# Patient Record
(demographics unavailable — no encounter records)

---

## 2019-01-10 NOTE — RAD
Examination: CT HEAD WO CONTRAST

 

History: DIZZINESS

 

Comparison/Correlation: None

 

Findings: Axial images of the head were obtained without contrast.

 

Atrophy is present. No intracranial hemorrhage, midline shift, or mass 

effect. Opacification of the left external auditory canal probably is due 

to cerumen. Degenerative space narrowing of the lower cervical spine. 

Deformity of the medial walls of maxillary sinuses noted. Correlate with 

surgical history. No depressed fracture.

 

 

Impression:

No intracranial hemorrhage.

 

Electronically signed by: Akira Floyd MD (1/10/2019 5:48 PM) [facility]

## 2019-01-10 NOTE — PHYS DOC
Adult General


Chief Complaint


Chief Complaint:  MULTIPLE COMPLAINTS





HPI


HPI





Patient is an 87 year old female who presents with several complaints.


#1. Continued frequency and urgency. Patient was seen 2 days ago and started on 

nitrofurantoin for urinary tract infection at an outlying clinic. Patient 

reports that she is doing a little bit better but continues to have to go 

frequently. Nothing seems to make this better or worse.


#2. Occasional right posterior thigh pain. Patient describes it as achy. No 

trauma. Patient is able to ambulate. Nothing seems to make it worse. Improves 

with Tylenol No. 3. Discomfort is mild at worst and nonexistent currently. 

Patient denies any swelling. Patient reports that her pants as well as her 

nylons go on without any new difficulties.


#3. Dizziness. She describes a rotational dizziness only when she goes to the 

back door to let in family members. This does not happen with any other exertion

, change in position, or head movements. Patient denies any chest pain or 

palpitations. Patient reports that last just a couple of minutes and goes away. 

There is no tinnitus, no drainage from the ears. There is no headache. No 

change in vision. []





Review of Systems


Review of Systems





Constitutional: Denies fever or chills []


Eyes: Denies change in visual acuity, redness, or eye pain []


HENT: Denies nasal congestion or sore throat []


Respiratory: Denies cough or shortness of breath []


Cardiovascular: No chest pain or palpitations[]


GI: Denies abdominal pain, nausea, vomiting, bloody stools or diarrhea []


: See history of present illness[]


Musculoskeletal: See history of present illness[]


Integument: Denies rash or skin lesions []


Neurologic: Denies headache, focal weakness or sensory changes []


Endocrine: Denies polyuria or polydipsia []





All other systems were reviewed and found to be within normal limits, except as 

documented in this note.





Physical Exam


Physical Exam





Constitutional: Well developed, well nourished, no acute distress, non-toxic 

appearance. []


HENT: Normocephalic, atraumatic, bilateral external ears normal, oropharynx 

moist, no oral exudates, nose normal. []


Eyes: PERRLA, EOMI, conjunctiva normal, no discharge. [] 


Neck: Normal range of motion, no tenderness, supple, no stridor. [] 


Cardiovascular:Heart rate regular rhythm, no murmur []


Lungs & Thorax:  Bilateral breath sounds clear to auscultation []


Abdomen: Bowel sounds normal, soft, no tenderness, no masses, no pulsatile 

masses. [] 


Skin: Warm, dry, no erythema, no rash. [] 


Back: No tenderness, no CVA tenderness. [] 


Extremities: No tenderness, no cyanosis, no clubbing, ROM intact, no edema. 

Patient's right thigh has no tenderness, no spasm, no hip or knee tenderness, 

and no edema. [] 


Neurologic: Alert and oriented X 3, normal motor function, normal sensory 

function, no focal deficits noted. Hallpike maneuvers were performed and were 

negative either for nystagmus or symptoms.[]


Psychologic: Affect normal, judgement normal, mood normal. []





EKG


EKG


Sinus rhythm, rate 61, normal axis, no ST elevations or depressions.[]





Radiology/Procedures


Radiology/Procedures


[]


Impressions:


Examination: CT HEAD WO CONTRAST


 


History: DIZZINESS


 


Comparison/Correlation: None


 


Findings: Axial images of the head were obtained without contrast.


 


Atrophy is present. No intracranial hemorrhage, midline shift, or mass 


effect. Opacification of the left external auditory canal probably is due 


to cerumen. Degenerative space narrowing of the lower cervical spine. 


Deformity of the medial walls of maxillary sinuses noted. Correlate with 


surgical history. No depressed fracture.


 


 


Impression:


No intracranial hemorrhage.


 


Electronically signed by: Akira Newsome MD (1/10/2019 5:48 PM) [facility]














DICTATED AND SIGNED BY:     AKIRA NEWSOME MD


DATE:     01/10/19 1602





CC: KARLA DAVIS DO; PEPE ALBERT





Course & Med Decision Making


Course & Med Decision Making


Pertinent Labs and Imaging studies reviewed. (See chart for details)





ED course: Patient arrived, was placed in bed, in tolerated exam well. Patient 

care endorsed to the oncoming physician at 1800 with labs and imaging studies 

pending.





The patient's head CT is negative for acute findings.





The patient's urinalysis is still significant for UTI. She is only had 3 doses 

of Macrobid. I will give her 1 g of Rocephin in the ED. Creatinine is 1.5. Her 

labs are significant for a slightly elevated troponin of 0.033 no previous for 

comparison. She also has a pro BNP of 900. Again had no previous for 

comparison. Patient denies any chest discomfort. I advised the patient would be 

best if she was admitted to the hospital for trending of her troponins and 

further evaluation of her elevated proBNP if warranted. Patient refused 

admission. She wants to go home. I explained that the risks including heart 

attack and/or death. She stated verbal understanding of these risks. Her family 

who accompanies her also acknowledged these risks. The patient would still 

prefer to go home and follow-up labs in the morning. She will be staying with 

family tonight and will return to emergency room if she develops worsening 

symptoms or any new symptoms. I have advised she continue her Macrobid as 

previously prescribed. She will be discharged[]





Dragon Disclaimer


Dragon Disclaimer


This electronic medical record was generated, in whole or in part, using a 

voice recognition dictation system.





Departure


Departure:


Impression:  


 Primary Impression:  


 UTI (urinary tract infection)


 Additional Impression:  


 Elevated troponin


Disposition:  01 HOME, SELF-CARE


Condition:  GUARDED


Referrals:  


PEEP ALBERT (PCP)


Patient Instructions:  Chest Pain (Nonspecific), Easy-to-Read


Scripts


Tramadol Hcl (TRAMADOL HCL) 50 Mg Tablet


50 MG PO PRN Q6HRS PRN for PAIN, #10 TAB


   Prov: GABE LOPEZ DO         1/10/19





Problem Qualifiers











KARLA DAVIS DO Oswald 10, 2019 17:33


GABE LOPEZ DO Oswald 10, 2019 18:11

## 2019-01-12 NOTE — EKG
[facility] [address], [place] [address]

Test Date:    2019-01-10               Test Time:    17:47:20

Pat Name:     BERTIN SOLORIO         Department:   

Patient ID:   [id number]           Room:          

Gender:       F                        Technician:   KEVIN

:          [id number]               Requested By: KARLA DAVIS

Order Number: [id number]            Reading MD:     

                                 Measurements

Intervals                              Axis          

Rate:         61                       P:            0

CA:           138                      QRS:          49

QRSD:         90                       T:            102

QT:           426                                    

QTc:          430                                    

                           Interpretive Statements

SINUS RHYTHM

QRS(T) CONTOUR ABNORMALITY

CONSIDER ANTEROLATERAL MYOCARDIAL DAMAGE

CONSIDER INFERIOR MYOCARDIAL DAMAGE

POSSIBLY ABNORMAL ECG

RI6.01          Unconfirmed report

No previous ECG available for comparison

## 2019-01-15 NOTE — RAD
Examination: CHEST AP ONLY

 

History: SYNCOPE, DIZZINESS

 

Comparison/Correlation: None

 

Findings: AP view of the chest was provided. Heart size and pulmonary 

vasculature are normal. No infiltrate or effusion. Calcified granulomas 

involve the right upper lung field. No pneumothorax. Levo convexity of the

lower thoracic spine noted.

 

 

Impression:

No infiltrate.

 

Electronically signed by: Akira Floyd MD (1/15/2019 12:18 PM) [id number]

## 2019-01-15 NOTE — PHYS DOC
Past History


Past Medical History:  Hypertension


Past Surgical History:  No Surgical History


Alcohol Use:  None


Drug Use:  None





Adult General


Chief Complaint


Chief Complaint:  SYNCOPE





HPI


HPI


87-year-old female presents with syncopal episode. The patient had a short 

syncopal episode at home today when she was getting ready to go to her doctor's 

appointment for follow-up of UTI from last week. She was a bit wobbly walking 

across the house and her son stepped up to support her. She had a short episode 

of syncope that lasted less than 5 seconds. She had an additional syncopal 

episode at her physician's office today. She did point-of-care blood work that 

showed a creatinine of 1.5 and a potassium of 2.7.  The patient was recently in 

this emergency for UTI symptoms and dizziness. She refused to be admitted at 

that time. Patient has not had fever or chills at home.





Review of Systems


Review of Systems





Constitutional: Denies fever or chills []


Eyes: Denies change in visual acuity, redness, or eye pain []


HENT: Denies nasal congestion or sore throat []


Respiratory: Denies cough or shortness of breath []


Cardiovascular: No additional information not addressed in HPI []


GI: Denies abdominal pain, nausea, vomiting, bloody stools or diarrhea []


: Denies dysuria or hematuria []


Musculoskeletal: Denies back pain or joint pain []


Integument: Denies rash or skin lesions []


Neurologic: Denies headache, focal weakness or sensory changes. Syncope[]


Endocrine: Denies polyuria or polydipsia []





All other systems were reviewed and found to be within normal limits, except as 

documented in this note.





Current Medications


Current Medications





Current Medications








 Medications


  (Trade)  Dose


 Ordered  Sig/Lucretia  Start Time


 Stop Time Status Last Admin


Dose Admin


 


 Potassium


 Chloride/Sodium


 Chloride  1,000 ml @ 


 200 mls/hr  1X  ONCE  1/15/19 11:30


 1/15/19 16:29   





 


 Sodium Chloride  1,000 ml @ 


 1,000 mls/hr  1X  ONCE  1/15/19 11:30


 1/15/19 12:29   














Allergies


Allergies





Allergies








Coded Allergies Type Severity Reaction Last Updated Verified


 


  No Known Drug Allergies    1/10/19 No











Physical Exam


Physical Exam





Constitutional: Well developed, well nourished, no acute distress, non-toxic 

appearance. []


HENT: Normocephalic, atraumatic, bilateral external ears normal, oropharynx 

moist, no oral exudates, nose normal. []


Eyes: PERRLA, EOMI, conjunctiva normal, no discharge. [] 


Neck: Normal range of motion, no tenderness, supple, no stridor. [] 


Cardiovascular:Heart rate regular rhythm, no murmur []


Lungs & Thorax:  Bilateral breath sounds clear to auscultation []


Abdomen: Bowel sounds normal, soft, no tenderness, no masses, no pulsatile 

masses. [] 


Skin: Warm, dry, no erythema, no rash. [] 


Back: No tenderness, no CVA tenderness. [] 


Extremities: No tenderness, no cyanosis, no clubbing, ROM intact, no edema. [] 


Neurologic: Alert and oriented X 3, normal motor function, normal sensory 

function, no focal deficits noted. []


Psychologic: Affect normal, judgement normal, mood normal. []





Current Patient Data


Vital Signs





 Vital Signs








  Date Time  Temp Pulse Resp B/P (MAP) Pulse Ox O2 Delivery O2 Flow Rate FiO2


 


1/15/19 11:25 97.5 62 16  100 Room Air  











EKG


EKG


Sinus rhythm, rate 62, normal axis, no ST elevations or depressions, flattened 

T waves.[]





Radiology/Procedures


Radiology/Procedures


[]


Impressions:


CT HEAD


 


INDICATION: Dizziness


 


COMPARISON: 1/10/2019


 


Exposure: One or more of the following individualized dose reduction 


techniques were utilized for this examination:  1. Automated exposure 


control  2. Adjustment of the mA and/or kV according to patient size  3. 


Use of iterative reconstruction technique


 


TECHNIQUE: 5 mm contiguous axial images were obtained from the skull base 


to the vertex in both bone and soft tissue algorithm.  


 


FINDINGS: 


No abnormal attenuation within the brain parenchyma.  


 


No evidence of acute intracranial hemorrhage.   No extra-axial fluid 


collections.


 


No mass effect or midline shift. Ventricular size is appropriate.  Basal 


cisterns are patent.


 


No fractures identified.Gray-white differentiation is preserved.Globes and


orbits are within normal limits.   Paranasal sinuses and mastoid air cells


are clear.   


 


IMPRESSION:


 


No acute intracranial findings. 


 


Electronically signed by: Matthew Torres MD (1/15/2019 12:04 PM) [facility]








DICTATED AND SIGNED BY:     MATTHEW TORRES MD


DATE:     01/15/19 1201





CC: GABE LOPEZ DO; BRYAN PALENCIA MD











Examination: CHEST AP ONLY


 


History: SYNCOPE, DIZZINESS


 


Comparison/Correlation: None


 


Findings: AP view of the chest was provided. Heart size and pulmonary 


vasculature are normal. No infiltrate or effusion. Calcified granulomas 


involve the right upper lung field. No pneumothorax. Levo convexity of the


lower thoracic spine noted.


 


 


Impression:


No infiltrate.


 


Electronically signed by: Akira Newsome MD (1/15/2019 12:18 PM) [id number]








DICTATED AND SIGNED BY:     AKIRA NEWSOME MD


DATE:     01/15/19 1217





CC: GABE LOPEZ DO; BRYAN PALENCIA MD





Course & Med Decision Making


Course & Med Decision Making


Pertinent Labs and Imaging studies reviewed. (See chart for details)


The patient's labs are unremarkable except for an elevated creatinine 1.4. Her 

potassium here was normal at 3.8. Her head CT is unremarkable. Her blood 

pressure has been stable the ED. Her EKG is unremarkable.  I discussed the 

patient with the hospitalist, Dr. Barrera and he is agreed to admit the patient 

for further syncopal workup. The patient was reluctant, but agreed to admission.


[]





Dragon Disclaimer


Dragon Disclaimer


This electronic medical record was generated, in whole or in part, using a 

voice recognition dictation system.





Departure


Departure:


Impression:  


 Primary Impression:  


 Syncope


Disposition:  09 ADMITTED AS INPATIENT


Admitting Physician:  Karena Barrera


Referrals:  


BRYAN PALENCIA MD (PCP)





Problem Qualifiers








 Primary Impression:  


 Syncope


 Syncope type:  unspecified  Qualified Codes:  R55 - Syncope and collapse








GABE LOPEZ DO Oswald 15, 2019 12:03

## 2019-01-15 NOTE — RAD
CT HEAD

 

INDICATION: Dizziness

 

COMPARISON: 1/10/2019

 

Exposure: One or more of the following individualized dose reduction 

techniques were utilized for this examination:  1. Automated exposure 

control  2. Adjustment of the mA and/or kV according to patient size  3. 

Use of iterative reconstruction technique

 

TECHNIQUE: 5 mm contiguous axial images were obtained from the skull base 

to the vertex in both bone and soft tissue algorithm.  

 

FINDINGS: 

No abnormal attenuation within the brain parenchyma.  

 

No evidence of acute intracranial hemorrhage.   No extra-axial fluid 

collections.

 

No mass effect or midline shift. Ventricular size is appropriate.  Basal 

cisterns are patent.

 

No fractures identified.Gray-white differentiation is preserved.Globes and

orbits are within normal limits.   Paranasal sinuses and mastoid air cells

are clear.   

 

IMPRESSION:

 

No acute intracranial findings. 

 

Electronically signed by: Matthew Torres MD (1/15/2019 12:04 PM) [place] [id number]

## 2019-01-16 NOTE — CARD
MR#: [id number]

Account#: [id number]

Accession#: [id number]

Date of Study: 01/16/2019

Ordering Physician: JOSEPHINE ALATORRE, 

Referring Physician: JOSEPHINE ALATORRE 

Tech: Camille Torres RDCS





--------------- APPROVED REPORT --------------





EXAM: Two-dimensional and M-mode echocardiogram with Doppler and color Doppler.



Other Information 

Quality : Good



INDICATION

Valvular Disease



2D DIMENSIONS 

RVDd2.3 (2.9-3.5cm)Left Atrium(2D)3.7 (1.6-4.0cm)

IVSd1.0 (0.7-1.1cm)Aortic Root(2D)2.5 (2.0-3.7cm)

LVDd4.5 (3.9-5.9cm)LVOT Diameter1.9 (1.8-2.4cm)

PWd1.2 (0.7-1.1cm)LVDs3.0 (2.5-4.0cm)

FS (%) 33.5 %SV58.3 ml



Aortic Valve

AoV Peak Terry.133.6cm/Emilia Peak GR.7.1mmHg

LVOT Peak Terry.120.2cm/sAVA (VMAX)2.49cm2



Mitral Valve

MV E Jmiqjann25.0cm/sMV DECEL [id number]

MV A Gmppbcix61.7cm/sE/A  Ratio0.9



Tricuspid Valve

TR P. Rhuwqmho448zr/sRAP GNSPUCKE0jdMm

TR Peak Gr.37cgAyOMUN93mrDv



Pulmonary Vein

S1 Loswqels92.1cm/sD2 Zifevbox19.9cm/s



 LEFT VENTRICLE 

The left ventricle is normal size. There is normal left ventricular wall thickness. The left ventricu
lar systolic function is normal and the ejection fraction is within normal range. The Ejection Fracti
on is 55-60%. There is normal LV segmental wall motion. Transmitral Doppler flow pattern is Grade I-a
bnormal relaxation pattern.



 RIGHT VENTRICLE 

The right ventricle is normal size. The right ventricular systolic function is normal.



 ATRIA 

The left atrium size is normal. The right atrium size is normal. The interatrial septum is intact wit
h no evidence for an atrial septal defect or patent foramen ovale as noted on 2-D or Doppler imaging.




 AORTIC VALVE 

The aortic valve is calcified but opens well. Doppler and Color Flow revealed no significant aortic r
egurgitation. There is no significant aortic valvular stenosis.



 MITRAL VALVE 

The mitral valve is calcified but opens well. There is no evidence of mitral valve prolapse. There is
 no mitral valve stenosis. Doppler and Color-flow revealed trace mitral regurgitation.



 TRICUSPID VALVE 

The tricuspid valve is normal in structure and function. Doppler and Color Flow revealed mild tricusp
id regurgitation. There is moderate pulmonary hypertension. The PA pressure was estimated at 41 mmHg.
 There is no tricuspid valve stenosis.



 PULMONIC VALVE 

The pulmonary valve is normal in structure and function. Doppler and Color Flow revealed mild pulmoni
c valvular regurgitation. There is no pulmonic valvular stenosis.



 GREAT VESSELS 

The aortic root is normal in size. The ascending aorta is normal in size. The IVC is normal in size a
nd collapses >50% with inspiration.



 PERICARDIAL EFFUSION 

There is no evidence of significant pericardial effusion.



Critical Notification

Critical Value: No



<Conclusion>

The left ventricle is normal size.

The left ventricular systolic function is normal and the ejection fraction is within normal range.

The Ejection Fraction is 55-60%.

There is no significant aortic valvular stenosis.

Doppler and Color Flow revealed no significant aortic regurgitation.

Doppler and Color-flow revealed trace mitral regurgitation.

Doppler and Color Flow revealed mild tricuspid regurgitation.

There is moderate pulmonary hypertension.

The PA pressure was estimated at 41 mmHg.



Signed by : Rayo Babb MD

Electronically Approved : 01/16/2019 16:30:19

## 2019-01-16 NOTE — PDOC2
MARILU KAPOOR APRN 1/16/19 0926:


CONSULT


Date of Admission


DATE: 1/16/19 


TIME: 09:10


Reason for Consult:


SYNCOPE


Problem List


Problems


Medical Problems:


(1) Syncope


Status: Acute  








History of Present Illness


Ms Dillon is an 87-year-old female who presented to the ED with complaints of 

a syncopal episode. She complains of dizziness only when up exerting.  Yesteday 

she was apparenlty being walked to the back door by son when she complained of 

dizziness and slumped forward.  Son caught her and lowered her to ground when 

she was aware in just 5 seconds or so.  She deines cp, daypnea, palpitations, 

lighehaededness.  She apparently had a similar episode in the PCP office so 

sent for direct admission. She reports episodes of dizziness occuring off and 

on over the last few months.  Currently she reports being symptoms free and he 

son reports she would like to go home.


Cardiovascular:  HTN


Psych:  Anxiety


Rheumatologic:  Rheumatoid arthritis


Past Surgical History


no history


Family History


non contributory due to age


Social History


no significant ETOH, no illicit drugs


Current Medications





Current Medications


Sodium Chloride 1,000 ml @  1,000 mls/hr 1X  ONCE IV  Last administered on 1/15/

19at 12:13;  Start 1/15/19 at 11:30;  Stop 1/15/19 at 12:30;  Status DC


Potassium Chloride/Sodium Chloride 1,000 ml @  200 mls/hr 1X  ONCE IV  Last 

administered on 1/15/19at 12:12;  Start 1/15/19 at 11:30;  Stop 1/15/19 at 16:29

;  Status DC


Sodium Chloride 1,000 ml @  75 mls/hr [id number] IV ;  Start 1/15/19 at 16:45;  

Stop 1/15/19 at 17:56;  Status DC


Potassium Chloride (Klor-Con) 40 meq 1X  ONCE PO  Last administered on 1/15/

19at 18:56;  Start 1/15/19 at 17:15;  Stop 1/15/19 at 17:16;  Status DC


Potassium Chloride/Dextrose/ Sod Cl 1,000 ml @  80 mls/hr [id number] IV  Last 

administered on 1/16/19at 08:39;  Start 1/15/19 at 18:00


Pravastatin Sodium (Pravachol) 10 mg QHS PO  Last administered on 1/15/19at 21:

32;  Start 1/15/19 at 21:00





Active Scripts


Active


Reported


Pravastatin Sodium 10 Mg Tablet 1 Tab PO QHS


Donepezil Hcl 5 Mg Tablet 1 Tab PO HS


Losartan-Hctz 50-12.5 Mg Tab (Losartan/Hydrochlorothiazide) 1 Each Tablet 1 Tab 

PO DAILY


Diazepam 5 Mg Tablet 5 Mg PO BID PRN


Allergies:  


Coded Allergies:  


     No Known Drug Allergies (Unverified , 1/10/19)


Review of System


as per HPI


VITALS





Vital Signs








  Date Time  Temp Pulse Resp B/P (MAP) Pulse Ox O2 Delivery O2 Flow Rate FiO2


 


1/16/19 05:47  65  149/86 (107)    


 


1/16/19 05:45 98.4  18  98 Room Air  








Labs





Laboratory Tests








Test


 1/15/19


11:40 1/15/19


12:55 1/15/19


14:50 1/16/19


06:28


 


White Blood Count


 4.5 x10^3/uL


(4.0-11.0) 


 


 3.5 x10^3/uL


(4.0-11.0)


 


Red Blood Count


 3.58 x10^6/uL


(3.50-5.40) 


 


 3.21 x10^6/uL


(3.50-5.40)


 


Hemoglobin


 10.8 g/dL


(12.0-15.5) 


 


 9.5 g/dL


(12.0-15.5)


 


Hematocrit


 33.4 %


(36.0-47.0) 


 


 29.6 %


(36.0-47.0)


 


Mean Corpuscular Volume 93 fL ()    92 fL () 


 


Mean Corpuscular Hemoglobin 30 pg (25-35)    30 pg (25-35) 


 


Mean Corpuscular Hemoglobin


Concent 32 g/dL


(31-37) 


 


 32 g/dL


(31-37)


 


Red Cell Distribution Width


 14.7 %


(11.5-14.5) 


 


 15.0 %


(11.5-14.5)


 


Platelet Count


 264 x10^3/uL


(140-400) 


 


 257 x10^3/uL


(140-400)


 


Neutrophils (%) (Auto) 75 % (31-73)    63 % (31-73) 


 


Lymphocytes (%) (Auto) 11 % (24-48)    23 % (24-48) 


 


Monocytes (%) (Auto) 11 % (0-9)    11 % (0-9) 


 


Eosinophils (%) (Auto) 1 % (0-3)    3 % (0-3) 


 


Basophils (%) (Auto) 2 % (0-3)    1 % (0-3) 


 


Neutrophils # (Auto)


 3.4 x10^3uL


(1.8-7.7) 


 


 2.2 x10^3uL


(1.8-7.7)


 


Lymphocytes # (Auto)


 0.5 x10^3/uL


(1.0-4.8) 


 


 0.8 x10^3/uL


(1.0-4.8)


 


Monocytes # (Auto)


 0.5 x10^3/uL


(0.0-1.1) 


 


 0.4 x10^3/uL


(0.0-1.1)


 


Eosinophils # (Auto)


 0.0 x10^3/uL


(0.0-0.7) 


 


 0.1 x10^3/uL


(0.0-0.7)


 


Basophils # (Auto)


 0.1 x10^3/uL


(0.0-0.2) 


 


 0.0 x10^3/uL


(0.0-0.2)


 


Sodium Level


 139 mmol/L


(136-145) 


 141 mmol/L


(136-145) 140 mmol/L


(136-145)


 


Potassium Level


 3.8 mmol/L


(3.5-5.1) 


 3.1 mmol/L


(3.5-5.1) 3.8 mmol/L


(3.5-5.1)


 


Chloride Level


 100 mmol/L


() 


 103 mmol/L


() 106 mmol/L


()


 


Carbon Dioxide Level


 28 mmol/L


(21-32) 


 28 mmol/L


(21-32) 26 mmol/L


(21-32)


 


Anion Gap 11 (6-14)   10 (6-14)  8 (6-14) 


 


Blood Urea Nitrogen


 38 mg/dL


(7-20) 


 35 mg/dL


(7-20) 26 mg/dL


(7-20)


 


Creatinine


 1.4 mg/dL


(0.6-1.0) 


 1.2 mg/dL


(0.6-1.0) 1.1 mg/dL


(0.6-1.0)


 


Estimated GFR


(Cockcroft-Gault) 43.0 


 


 51.4 


 56.9 





 


BUN/Creatinine Ratio 27 (6-20)    24 (6-20) 


 


Glucose Level


 119 mg/dL


(70-99) 


 92 mg/dL


(70-99) 101 mg/dL


(70-99)


 


Calcium Level


 9.7 mg/dL


(8.5-10.1) 


 9.0 mg/dL


(8.5-10.1) 8.6 mg/dL


(8.5-10.1)


 


Magnesium Level


 2.4 mg/dL


(1.8-2.4) 


 


 





 


Total Bilirubin


 0.6 mg/dL


(0.2-1.0) 


 


 0.5 mg/dL


(0.2-1.0)


 


Aspartate Amino Transf


(AST/SGOT) 27 U/L (15-37) 


 


 


 19 U/L (15-37) 





 


Alanine Aminotransferase


(ALT/SGPT) 19 U/L (14-59) 


 


 


 16 U/L (14-59) 





 


Alkaline Phosphatase


 58 U/L


() 


 


 48 U/L


()


 


Troponin I Quantitative


 0.046 ng/mL


(0-0.055) 


 


 





 


Total Protein


 7.8 g/dL


(6.4-8.2) 


 


 6.6 g/dL


(6.4-8.2)


 


Albumin


 3.8 g/dL


(3.4-5.0) 


 


 3.0 g/dL


(3.4-5.0)


 


Albumin/Globulin Ratio 1.0 (1.0-1.7)    0.8 (1.0-1.7) 


 


Urine Collection Type  Unknown   


 


Urine Color  Orange   


 


Urine Clarity  Hazy   


 


Urine pH  6.0   


 


Urine Specific Gravity  <=1.005   


 


Urine Protein   (NEG-TRACE)   


 


Urine Glucose (UA)   mg/dL (NEG)   


 


Urine Ketones (Stick)   mg/dL (NEG)   


 


Urine Blood   (NEG)   


 


Urine Nitrite   (NEG)   


 


Urine Bilirubin  Neg (NEG)   


 


Urine Urobilinogen Dipstick


 


 mg/dL (0.2


mg/dL) 


 





 


Urine Leukocyte Esterase   (NEG)   


 


Urine RBC  3-5 /HPF (0-2)   


 


Urine WBC


 


 11-20 /HPF


(0-4) 


 





 


Urine Squamous Epithelial


Cells 


 Few /LPF 


 


 





 


Urine Bacteria


 


 Few /HPF


(0-FEW) 


 











Images


CT head - IMPRESSION: No acute intracranial findings. 





CXR - Impression: No infiltrate.





Carotid duplex - Impression:  Mild diffuse atherosclerotic vascular disease 

less than 50 percent stenosis of the bilateral internal carotid arteries by 

ultrasound criterion


Assessment/Plan


1.  Syncope - no significant carotid disease, antegrade vertebral flow, mild 

orthostasis but standing pressure currently 140s systolic.  No arrhythmias on 

tele.  await echo and suggest outpatient event monitoring.  as symptoms occur 

only with exertion but not affected by position change, suggest 6 min walk for 

chronotropic response.


2.  Bradycardia - check for chronotropic response


3.  acute renal insufficiency - ARB, HCTZ on hold.  Cr improved. 


4.  UTI - per PCP





YANN NAGY MD 1/16/19 1725:


CONSULT


Assessment/Plan


Patient seen and examined. Agree with above nurse practitioner note.


87-year-old woman with near syncope. She had significant desaturation with 

ambulation. No obvious etiology has been determined thus far. We will plan for 

a CT scan of the chest given that her d-dimer has been elevated. Echocardiogram 

at baseline does not reveal any significant cardiac pathology. Consider 

outpatient event monitor if CT scan is nonrevealing. Further evaluation of 

hypoxia per primary service.











MARILU KAPOOR Jan 16, 2019 09:26


YANN NAGY MD Jan 16, 2019 17:25

## 2019-01-16 NOTE — RAD
Exam : Carotid Duplex with Grayscale Ultrasound and Spectral and Color 

Doppler Analysis 1/16/2019 8:51 AM

 

Clinical Indications:   Syncope

 

Comparison study:  None available.

 

PQRS Compliance Statement - Stenosis calculations for CT, MR and 

conventional angiography are based upon measurement of the distal ICA 

diameter in accordance with the NASCET methodology.  Stenosis calculations

for carotid ultrasound studies are derived from validated velocity 

criteria which are known to correlate with the NASCET methodology.

 

Findings: 

 

 The common, internal and external carotid arteries were examined by 

grayscale, color and spectral Doppler ultrasound.  There is mild diffuse 

atherosclerotic vascular disease, which is most prominent in the left 

carotid bulb. No high-grade visual stenosis is identified on color Doppler

imaging with spectral analysis. 

 

 

 

The following are the velocities and ratios in the carotid arteries on 

both sides:

 

RIGHT ICA PV:                    71cm/sec

RIGHT CCA PV:                    52cm/sec

RIGHT ICA ED:                    19cm/sec

RIGHT IC/CCPV:                   Less than 2

RIGHT VERTEBRAL:                  antegrade flow

 

LEFT ICA PV:                    88cm/sec

LEFT CCA PV:                    66cm/sec

LEFT ICA ED:                    26cm/sec

LEFT IC/CCPV:                         Less than 2

LEFT VERTEBRAL:                  antegrade flow

 

 

<50% ICA Stenosis: PSV < 125cm/s (EDV < 40cm/s; SVR < 2.0)

50-69% ICA Stenosis: PSV < 125-229cm/s (EDV  40-99cm/s; SVR  2.0-3.9)

>70% ICA Stenosis: PSV > 230cm/s (EDV >100cm/s; SVR  >4.0)

 

Impression:  Mild diffuse atherosclerotic vascular disease less than 50 

percent stenosis of the bilateral internal carotid arteries by ultrasound 

criterion

 

Electronically signed by: Jason Yeh MD (1/16/2019 8:52 AM) [id number]

## 2019-01-16 NOTE — HP
ADMIT DATE:  01/15/2019



HISTORY OF PRESENT ILLNESS:  The patient is an 87-year-old -American

female patient who was brought to the Emergency Room.  She presented with

multiple syncopal episodes.  She initially has one when she was getting ready to

go to her doctor's appointment for followup of her UTI that was treated last

week.  She was a bit wobbly walking across the house and her son stepped up to

support her.  She had a short episode of syncope that lasted less than 5

seconds.  She had an additional episode while at her physician's office today. 

She did point-of-care blood work that showed her creatinine of 1.5 and a

potassium was low at 2.7.  She was actually recently seen at the Emergency Room,

[facility] for UTI symptoms and dizziness.  At that time, she refused

to be admitted; however, she denied any fever, chills, or rigors.  She was

extensively investigated in the Emergency Room.  She was found to be dehydrated;

however, she has no leukocytosis.  Urinalysis showed that she has 11-20 wbc's,

but very few bacteria, negative for nitrites.  Her CT scan of the head was

unremarkable and showed no acute intracranial finding and her chest x-ray was

also unremarkable and showed no infiltrate.  She was admitted on a monitor bed

and we did consult the cardiology team to assist in her management.



PAST MEDICAL HISTORY:  Significant for hypertension.  She does not have any

other medical problem.



PAST SURGICAL HISTORY:  Unremarkable.



ALLERGIES:  She has no known drug allergies.



MEDICATIONS:  She is currently on following medications.  She is on Aricept 5 mg

at bedtime, pravastatin 10 mg at bedtime, losartan/hydrochlorothiazide 50/12.5

mg once a day, and diazepam 5 mg twice a day.



FAMILY HISTORY:  Unremarkable.



SOCIAL HISTORY:  She is  and lives with her .  She has 1 son.  She

does not smoke, drink alcohol, or use recreational drugs.



REVIEW OF SYSTEMS:  As per history of present illness.



PHYSICAL EXAMINATION:

GENERAL:  When arrival to the Emergency Room, she looked well and was clearly in

no apparent respiratory distress.  No pallor, jaundice, cyanosis, or

thyromegaly.  No jugular venous distension.  No lower limb edema.

VITAL SIGNS:  Her heart rate was 60, blood pressure was 138/68, temperature was

98.3, respiratory rate 20, and oxygen saturation was 98%.

HEAD, EYES, EARS, NOSE, AND THROAT:  Showed normocephalic, atraumatic.

NECK:  Supple.

HEART:  Showed normal first and second heart sounds.  No gallop, rub, or murmur.

CHEST:  Clear to auscultation.  No crepitation or rhonchi.

ABDOMEN:  Distended, soft, nontender.

NEUROLOGIC:  She was awake, alert, responding appropriately.  All cranial nerves

are intact.  She moves extremities without difficulty.  She normally ambulates

without assistance or assistive devices.



LABORATORY DATA AND IMAGING:  While in the Emergency Room, her lab work showed a

white cell count 4500, hemoglobin 11, hematocrit 33, MCV 93, and platelet count

264,000 with normal manual differential.  Her chemistry showed a serum sodium

139, potassium 3.8, chloride 100, bicarbonate 28, anion gap of 11, BUN 38,

creatinine 1.4, estimated GFR was 43 mL per minute.  Her glucose was 119,

calcium was 9.7, magnesium was 2.4.  Total bilirubin, AST, ALT, alkaline

phosphatase were normal.  Her total protein was 7.8, albumin 3.8.  Urinalysis

showed the urine was orange, hazy with a pH of 6, specific gravity 1.005.  The

urine was negative for protein, glucose, ketones, blood, and nitrite, negative

for leukocyte esterase. There are 3-5 rbc's, 11-20 wbc's, very few bacteria. 

She did have a CT scan of the head, which basically showed no abnormal

attenuation within the brain parenchyma, no evidence of acute intracranial

hemorrhage, no extraaxial fluid collection, no mass effect or midline shift,

ventricular size is appropriate.  Basal cisterns are patent.  No fracture is

identified.  Gray-white differentiation is preserved.  Globes and orbits are

within normal limits.  Paranasal sinuses and mastoid air cells are clear.  Her

chest x-ray showed the heart size and pulmonary vasculature are normal.  No

infiltrate or effusion.  Calcific granulomas involve the right upper lung field.

 No pneumothorax.  Level of complexity of the lower thoracic spine noted.



IMPRESSION AND PLAN:  In summary, this is an 87-year-old old -American

female patient who was admitted with recurrent syncopal episode.  We will

arrange for her to have carotid Doppler ultrasound and echocardiogram.  We will

consult Cardiology team.  We will also arrange for her to have orthostatics and

we will decide on further management accordingly.





______________________________

JOSEPHINE ALATORRE MD



DR:  Sherrill  JOB#:  [id number] / [id number]

DD:  01/16/2019 12:32  DT:  01/16/2019 13:22

## 2019-01-18 NOTE — EKG
[facility] [address], [place] [address]

Test Date:    2019-01-15               Test Time:    11:44:08

Pat Name:     JOHN SOLORIO         Department:   

Patient ID:   [id number]           Room:         124 A

Gender:       F                        Technician:   KEVIN

:          [id number]               Requested By: GABE LOPEZ

Order Number: [id number]            Reading MD:   Dhruv Barry MD

                                 Measurements

Intervals                              Axis          

Rate:         62                       P:            -25

CT:           168                      QRS:          68

QRSD:         98                       T:            91

QT:           440                                    

QTc:          449                                    

                           Interpretive Statements

SINUS RHYTHM

[facility]

NON-SPECIFIC ST/T CHANGES

Electronically Signed On 2019 10:17:29 CST by Dhruv Barry MD

## 2020-12-22 NOTE — RAD
Study: XR LUMBAR SPINE 2-3V



Indication: Right leg pain.



Comparison: None.



Findings:



Minimal lumbar dextrocurvature with the apex at L3. Lumbar lordosis is maintained. Grade 1 anterolist
hesis of L4 on L5.



Mild scattered disc space narrowing. Age-indeterminate mild superior endplate height loss at L2. Mult
ilevel facet degeneration.



Aortobiiliac calcific atherosclerosis. Osteopenia. Scattered pelvic phleboliths.



Impression:



1. Mild superior endplate height loss at L2 which could be chronic but recommend correlation for pinp
oint tenderness.

2. Mild disc space narrowing and multilevel facet degeneration. Grade 1 anterolisthesis of L4 on L5 o
n a degenerative basis.

3. Osteopenia.



Electronically signed by: CLEM MILLER MD (12/22/2020 12:12 AM) [facility]